# Patient Record
Sex: MALE | Race: WHITE | NOT HISPANIC OR LATINO | Employment: FULL TIME | URBAN - METROPOLITAN AREA
[De-identification: names, ages, dates, MRNs, and addresses within clinical notes are randomized per-mention and may not be internally consistent; named-entity substitution may affect disease eponyms.]

---

## 2021-04-26 ENCOUNTER — HISTORICAL (OUTPATIENT)
Dept: ADMINISTRATIVE | Facility: HOSPITAL | Age: 46
End: 2021-04-26

## 2021-04-26 LAB
ABS NEUT (OLG): 3.99 X10(3)/MCL (ref 2.1–9.2)
ERYTHROCYTE [DISTWIDTH] IN BLOOD BY AUTOMATED COUNT: 13.2 % (ref 11.5–17)
HCT VFR BLD AUTO: 45.1 % (ref 42–52)
HGB BLD-MCNC: 15.2 GM/DL (ref 14–18)
MCH RBC QN AUTO: 28.7 PG (ref 27–31)
MCHC RBC AUTO-ENTMCNC: 33.7 GM/DL (ref 33–36)
MCV RBC AUTO: 85.1 FL (ref 80–94)
NRBC BLD AUTO-RTO: 0 % (ref 0–0.2)
PLATELET # BLD AUTO: 249 X10(3)/MCL (ref 130–400)
PMV BLD AUTO: 10.7 FL (ref 7.4–10.4)
RBC # BLD AUTO: 5.3 X10(6)/MCL (ref 4.7–6.1)
WBC # SPEC AUTO: 6.5 X10(3)/MCL (ref 4.5–11.5)

## 2021-05-24 LAB — SARS-COV-2 AG RESP QL IA.RAPID: NEGATIVE

## 2021-05-25 ENCOUNTER — HISTORICAL (OUTPATIENT)
Dept: SURGERY | Facility: HOSPITAL | Age: 46
End: 2021-05-25

## 2021-08-04 ENCOUNTER — HISTORICAL (OUTPATIENT)
Dept: ADMINISTRATIVE | Facility: HOSPITAL | Age: 46
End: 2021-08-04

## 2022-04-11 ENCOUNTER — HISTORICAL (OUTPATIENT)
Dept: ADMINISTRATIVE | Facility: HOSPITAL | Age: 47
End: 2022-04-11

## 2022-05-03 NOTE — HISTORICAL [facility]
This is a historical note converted from Deejay. Formatting and pictures may have been removed.  Please reference Deejay for original formatting and attached multimedia. Chief Complaint  left knee pain no injury or surgery Referred by Maame..pain started in November..MRI [facility] 3-30-21  History of Present Illness  ?? He is a pleasant 45-year-old whose had left knee pain since?November 2020. ?The pains located on the medial side of the knee. ?He notes it worse with squatting and twisting movements. ?It somewhat better?at rest. ?He is tried anti-inflammatory medicines and aspiration and injection?with out relief.? He denies any numbness or tingling  Review of Systems  Comprehensive review of system?was performed with no exceptions other than noted in the history of present illness  Physical Exam  Vitals & Measurements  T:?97.4? ?F (Oral)? HR:?48(Apical)? BP:?147/90?  HT:?180.00?cm? WT:?127.000?kg? BMI:?39.2?  Gen: WN, WD, NAD  Card/Res: NL breathing, +distal pulses  Abdomen: ND  Standing exam  stance:?Correctable varus alignment, no significant leg-length discrepancy  gait:?Antalgic limp  ?   Knee examination  - General comments: unremarkable appearance  ?   - Tenderness: No joint line  ?   Knee??????????RIGHT??????????LEFT  Skin: ??????????Intact ??????????Intact  ROM:??????????0-130??????????0-130  Effusion:????? Neg ???????????? +  MJL TTP:????? Neg ???????????? +  LJL TTP: ?????? Neg ???????????? Neg  Jose:? ?Neg ???????????? +  Pat crep:?????? Neg ???????????????Neg  Patella TTPs: Neg ???????????????Neg  Patella grind: Neg??????????? ?Neg  Lachman: ?????Neg ????????????????????Neg  Pivot shift: ?????Neg ???????????? Neg  Valgus stress: Neg ???????????????Neg  Varus stress: Neg ???????????????Neg  Posterior drawer: Neg ??????????Neg  ?   N-V ????????????????????intact??????????intact  Hip:?????????????????????????nml?????????? nml  ?   Lower extremity edema:Negative  ?  Assessment/Plan  1.?Tear of medial  meniscus of left knee?S83.242A  ?I recommend surgical intervention: Left knee arthroscopic medial meniscus root repair.? We discussed the details of the procedure and the expected postoperative course.? We discussed the benefits of surgery which would be to decrease his pain and increase his function.? We discussed the risk of surgery?which is small but could be significant if he has continued pain, retear, stiffness, or infection.? After discussion he like to proceed.? He will call us with a surgical day  Ordered:  Office/Outpatient Visit [id number], Tear of medial meniscus of left knee, Orthopaed[facility] Clinic, 04/26/21 9:36:00 CDT  ?  Orders:  XR Knee Left 4 or More Views, Routine, 04/26/21 9:04:00 CDT, None, Ambulatory, Rad Type, Left knee pain, Not Scheduled, 04/26/21 9:04:00 CDT   Problem List/Past Medical History  Ongoing  Obesity  Tear of medial meniscus of left knee  Historical  No qualifying data  Procedure/Surgical History  Tonsillectomy (1993)   Medications  losartan 100 mg oral tablet, 100 mg= 1 tab(s), Oral, Daily  montelukast 10 mg oral TABLET, 10 mg= 1 tab(s), Oral, Daily  Allergies  No Known Medication Allergies  Social History  Abuse/Neglect  No, 04/26/2021  Tobacco  Never (less than 100 in lifetime), N/A, 04/26/2021  Health Maintenance  Health Maintenance  ???Pending?(in the next year)  ??? ??Due?  ??? ? ? ?ADL Screening due??04/26/21??and every 1??year(s)  ??? ? ? ?Aspirin Therapy for CVD Prevention due??04/26/21??and every 1??year(s)  ??? ? ? ?Tetanus Vaccine due??04/26/21??and every 10??year(s)  ??? ??Due In Future?  ??? ? ? ?Obesity Screening not due until??01/01/22??and every 1??year(s)  ??? ? ? ?Alcohol Misuse Screening not due until??01/02/22??and every 1??year(s)  ???Satisfied?(in the past 1 year)  ??? ??Satisfied?  ??? ? ? ?Alcohol Misuse Screening on??04/26/21.??Satisfied by Corrie Flynn LPN  ??? ? ? ?Blood Pressure Screening on??04/26/21.??Satisfied by Corrie Flynn LPN  ???  ? ? ?Body Mass Index Check on??04/26/21.??Satisfied by Corrie Flynn LPN  ??? ? ? ?Obesity Screening on??04/26/21.??Satisfied by Corrie Flynn LPN  ?  Diagnostic Results  Knee radiographs show no arthritis

## 2022-05-09 ENCOUNTER — OFFICE VISIT (OUTPATIENT)
Dept: ORTHOPEDICS | Facility: CLINIC | Age: 47
End: 2022-05-09
Payer: COMMERCIAL

## 2022-05-09 VITALS — HEIGHT: 71 IN | WEIGHT: 290 LBS | BODY MASS INDEX: 40.6 KG/M2

## 2022-05-09 DIAGNOSIS — M17.12 PRIMARY OSTEOARTHRITIS OF LEFT KNEE: Primary | ICD-10-CM

## 2022-05-09 PROCEDURE — 99213 OFFICE O/P EST LOW 20 MIN: CPT | Mod: 25,,, | Performed by: ORTHOPAEDIC SURGERY

## 2022-05-09 PROCEDURE — 3008F PR BODY MASS INDEX (BMI) DOCUMENTED: ICD-10-PCS | Mod: CPTII,,, | Performed by: ORTHOPAEDIC SURGERY

## 2022-05-09 PROCEDURE — 20610 LARGE JOINT ASPIRATION/INJECTION: L KNEE: ICD-10-PCS | Mod: LT,,, | Performed by: ORTHOPAEDIC SURGERY

## 2022-05-09 PROCEDURE — 1159F MED LIST DOCD IN RCRD: CPT | Mod: CPTII,,, | Performed by: ORTHOPAEDIC SURGERY

## 2022-05-09 PROCEDURE — 20610 DRAIN/INJ JOINT/BURSA W/O US: CPT | Mod: LT,,, | Performed by: ORTHOPAEDIC SURGERY

## 2022-05-09 PROCEDURE — 1159F PR MEDICATION LIST DOCUMENTED IN MEDICAL RECORD: ICD-10-PCS | Mod: CPTII,,, | Performed by: ORTHOPAEDIC SURGERY

## 2022-05-09 PROCEDURE — 3008F BODY MASS INDEX DOCD: CPT | Mod: CPTII,,, | Performed by: ORTHOPAEDIC SURGERY

## 2022-05-09 PROCEDURE — 99213 PR OFFICE/OUTPT VISIT, EST, LEVL III, 20-29 MIN: ICD-10-PCS | Mod: 25,,, | Performed by: ORTHOPAEDIC SURGERY

## 2022-05-09 RX ORDER — MONTELUKAST SODIUM 10 MG/1
10 TABLET ORAL DAILY
COMMUNITY
Start: 2021-12-11

## 2022-05-09 RX ORDER — CARVEDILOL PHOSPHATE 20 MG/1
20 CAPSULE, EXTENDED RELEASE ORAL
COMMUNITY
Start: 2021-11-15

## 2022-05-09 RX ORDER — LOSARTAN POTASSIUM AND HYDROCHLOROTHIAZIDE 25; 100 MG/1; MG/1
1 TABLET ORAL DAILY
COMMUNITY
Start: 2022-03-08

## 2022-05-09 RX ORDER — ROSUVASTATIN CALCIUM 10 MG/1
TABLET, COATED ORAL
COMMUNITY
Start: 2022-05-07

## 2022-05-09 RX ORDER — MELOXICAM 15 MG/1
15 TABLET ORAL DAILY
COMMUNITY
Start: 2022-04-18 | End: 2022-07-16

## 2022-05-09 RX ORDER — DEXAMETHASONE 6 MG/1
6 TABLET ORAL
COMMUNITY
Start: 2021-08-03

## 2022-05-09 RX ORDER — ALBUTEROL SULFATE 90 UG/1
AEROSOL, METERED RESPIRATORY (INHALATION)
COMMUNITY
Start: 2021-08-03

## 2022-05-09 NOTE — PROCEDURES
Large Joint Aspiration/Injection: L knee    Date/Time: 5/9/2022 8:15 AM  Performed by: Antoine Scott Jr., MD  Authorized by: Antoine Scott Jr., MD     Consent Done?:  Yes (Verbal)  Indications:  Pain and arthritis  Site marked: the procedure site was marked    Timeout: prior to procedure the correct patient, procedure, and site was verified    Prep: patient was prepped and draped in usual sterile fashion      Local anesthesia used?: Yes    Local anesthetic:  Topical anesthetic    Details:  Needle Size:  22 G  Ultrasonic Guidance for needle placement?: No    Approach:  Superior  Location:  Knee  Site:  L knee  Medications:  48 mg hylan g-f 20 48 mg/6 mL     Left SynviscONE

## 2022-05-09 NOTE — PROGRESS NOTES
Chief Complaint:   Chief Complaint   Patient presents with    Left Knee - Follow-up    Pain     Left knee synvisc injection       Consulting Physician: No ref. provider found    History of present illness:    5/25/2021: Left knee medial meniscus root repair    He returns today. Injection on 1/31/22 helped for about 6 weeks. 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 Denies effusion. We started the Mobic which is made a big difference. He status post MRI.      History reviewed. No pertinent past medical history.    Past Surgical History:   Procedure Laterality Date    KNEE SURGERY      TONSILLECTOMY         Current Outpatient Medications   Medication Sig    albuterol (PROAIR HFA) 90 mcg/actuation inhaler Inhale into the lungs.    CARVEDILOL PHOSPHATE 20 MG ORAL CM24 (COREG CR) 20 mg 24 hr capsule Take 20 mg by mouth.    dexAMETHasone (DECADRON) 6 MG tablet Take 6 mg by mouth.    losartan-hydrochlorothiazide 100-25 mg (HYZAAR) 100-25 mg per tablet Take 1 tablet by mouth once daily.    meloxicam (MOBIC) 15 MG tablet Take 15 mg by mouth once daily.    montelukast (SINGULAIR) 10 mg tablet Take 10 mg by mouth once daily.    rosuvastatin (CRESTOR) 10 MG tablet      No current facility-administered medications for this visit.       Review of patient's allergies indicates:  No Known Allergies    History reviewed. No pertinent family history.    Social History     Socioeconomic History    Marital status:    Tobacco Use    Smoking status: Never Smoker    Smokeless tobacco: Never Used   Substance and Sexual Activity    Alcohol use: Yes       Review of Systems:    Constitution:   Denies chills, fever, and sweats.  HENT:   Denies headaches or blurry vision.  Cardiovascular:  Denies chest pain or irregular heart beat.  Respiratory:   Denies cough or shortness of breath.  Gastrointestinal:  Denies abdominal pain, nausea, or vomiting.  Musculoskeletal:   Denies muscle  "cramps.  Neurological:   Denies dizziness or focal weakness.  Psychiatric/Behavior: Normal mental status.  Hematology/Lymph:  Denies bleeding problem or easy bruising/bleeding.  Skin:    Denies rash or suspicious lesions.    Examination:    Vital Signs:    Vitals:    05/09/22 0813   Weight: 131.5 kg (290 lb)   Height: 5' 11" (1.803 m)       Body mass index is 40.45 kg/m².    Constitution:   Well-developed, well nourished patient in no acute distress.  Neurological:   Alert and oriented x 3 and cooperative to examination.     Psychiatric/Behavior: Normal mental status.  Respiratory:   No shortness of breath.  Eyes:    Extraoccular muscles intact  Skin:    No scars, rash or suspicious lesions.    MSK:   .Standing exam  stance: normal alignment, no significant leg-length discrepancy  gait:     Knee examination  - General comments: unremarkable appearance    - Tenderness: medial joint line    Knee                  RIGHT    LEFT  Skin:                  Intact      Intact  ROM:                 0-130      0-130  Effusion:             Neg        +  MJL TTP:           Neg         +  LJL TTP:            Neg         Neg  Jose:         Neg          +  Pat crep:            Neg         Neg  Patella TTPs:     Neg         Neg  Patella grind:      Neg        Neg  Lachman:           Neg        Neg  Pivot shift:          Neg        Neg  Valgus stress:    Neg        Neg  Varus stress:      Neg        Neg  Posterior drawer: Neg       Neg    N-V intact intact  Hip: nml nml    Lower extremity edema:Negative         Assessment: Primary osteoarthritis of left knee  -     Large Joint Aspiration/Injection: L knee        Plan:  Synvisc injection today    "

## 2023-12-24 ENCOUNTER — OFFICE VISIT (OUTPATIENT)
Dept: URGENT CARE | Facility: CLINIC | Age: 48
End: 2023-12-24
Payer: COMMERCIAL

## 2023-12-24 VITALS
RESPIRATION RATE: 18 BRPM | SYSTOLIC BLOOD PRESSURE: 107 MMHG | TEMPERATURE: 98 F | HEIGHT: 71 IN | HEART RATE: 75 BPM | OXYGEN SATURATION: 96 % | DIASTOLIC BLOOD PRESSURE: 70 MMHG | WEIGHT: 290 LBS | BODY MASS INDEX: 40.6 KG/M2

## 2023-12-24 DIAGNOSIS — J10.1 INFLUENZA A: Primary | ICD-10-CM

## 2023-12-24 LAB
CTP QC/QA: YES
MOLECULAR STREP A: NEGATIVE
POC MOLECULAR INFLUENZA A AGN: POSITIVE
POC MOLECULAR INFLUENZA B AGN: NEGATIVE
SARS-COV-2 RDRP RESP QL NAA+PROBE: NEGATIVE

## 2023-12-24 PROCEDURE — 87635: ICD-10-PCS | Mod: QW,,,

## 2023-12-24 PROCEDURE — 99204 OFFICE O/P NEW MOD 45 MIN: CPT | Mod: ,,,

## 2023-12-24 PROCEDURE — 87502 INFLUENZA DNA AMP PROBE: CPT | Mod: QW,,,

## 2023-12-24 PROCEDURE — 99204 PR OFFICE/OUTPT VISIT, NEW, LEVL IV, 45-59 MIN: ICD-10-PCS | Mod: ,,,

## 2023-12-24 PROCEDURE — 87651 POCT STREP A MOLECULAR: ICD-10-PCS | Mod: QW,,,

## 2023-12-24 PROCEDURE — 87502 POCT INFLUENZA A/B MOLECULAR: ICD-10-PCS | Mod: QW,,,

## 2023-12-24 PROCEDURE — 87651 STREP A DNA AMP PROBE: CPT | Mod: QW,,,

## 2023-12-24 PROCEDURE — 87635 SARS-COV-2 COVID-19 AMP PRB: CPT | Mod: QW,,,

## 2023-12-24 RX ORDER — PROMETHAZINE HYDROCHLORIDE AND DEXTROMETHORPHAN HYDROBROMIDE 6.25; 15 MG/5ML; MG/5ML
5 SYRUP ORAL EVERY 6 HOURS PRN
Qty: 118 ML | Refills: 0 | Status: SHIPPED | OUTPATIENT
Start: 2023-12-24 | End: 2024-01-03

## 2023-12-24 RX ORDER — OSELTAMIVIR PHOSPHATE 75 MG/1
75 CAPSULE ORAL 2 TIMES DAILY
Qty: 10 CAPSULE | Refills: 0 | Status: SHIPPED | OUTPATIENT
Start: 2023-12-24 | End: 2023-12-29

## 2023-12-24 RX ORDER — TIRZEPATIDE 7.5 MG/.5ML
7.5 INJECTION, SOLUTION SUBCUTANEOUS WEEKLY
COMMUNITY
Start: 2023-11-28

## 2023-12-24 RX ORDER — PROMETHAZINE HYDROCHLORIDE AND DEXTROMETHORPHAN HYDROBROMIDE 6.25; 15 MG/5ML; MG/5ML
5 SYRUP ORAL EVERY 6 HOURS PRN
Qty: 118 ML | Refills: 0 | Status: SHIPPED | OUTPATIENT
Start: 2023-12-24 | End: 2023-12-24

## 2023-12-24 RX ORDER — OSELTAMIVIR PHOSPHATE 75 MG/1
75 CAPSULE ORAL 2 TIMES DAILY
Qty: 10 CAPSULE | Refills: 0 | Status: SHIPPED | OUTPATIENT
Start: 2023-12-24 | End: 2023-12-24

## 2023-12-24 NOTE — PATIENT INSTRUCTIONS
Flu A+      Excuse through Thursday.     Drink plenty of fluids. Get plenty of rest.     Prescription cough syrup may make you drowsy, do not drive while taking or take nightly.   Nasal spray such as Nasacort or Flonase for congestion.  Warm saltwater gargles for sore throat.   Tylenol or ibuprofen as needed for sore throat and fever. May alternate every 3 hours.  Chloraseptic Spray for worsening sore throat  Call or return to clinic as needed   Go to the ER with any significant change or worsening of symptoms.   Follow up with your primary care doctor.

## 2023-12-24 NOTE — PROGRESS NOTES
"Subjective:      Patient ID: Estiven Esqueda is a 48 y.o. male.    Vitals:  height is 5' 11" (1.803 m) and weight is 131.5 kg (290 lb). His temperature is 98.4 °F (36.9 °C). His blood pressure is 107/70 and his pulse is 75. His respiration is 18 and oxygen saturation is 96%.     Chief Complaint: Cough (Pt presents to clinic with cough, fever , body aches , chills and sore throat. Symptomatic x this morning.)    Patient is a 48-year-old male who presents to urgent care clinic with complaints of cough, fever, T-max 100.2° at home with body aches and chills this morning.  Patient does report mild sore throat.  Denies neck stiffness, rash, GI symptoms, shortness of breath.  Patient's spouse is here with similar symptoms.    Cough        Respiratory:  Positive for cough.       Objective:     Physical Exam   Constitutional: He is oriented to person, place, and time. He appears well-developed. He is cooperative.  Non-toxic appearance. He does not appear ill. No distress.   HENT:   Head: Normocephalic and atraumatic.   Ears:   Right Ear: Hearing, tympanic membrane, external ear and ear canal normal.   Left Ear: Hearing, tympanic membrane, external ear and ear canal normal.   Nose: Nose normal. No mucosal edema, rhinorrhea or nasal deformity. No epistaxis. Right sinus exhibits no maxillary sinus tenderness and no frontal sinus tenderness. Left sinus exhibits no maxillary sinus tenderness and no frontal sinus tenderness.   Mouth/Throat: Uvula is midline and mucous membranes are normal. Mucous membranes are moist. No trismus in the jaw. Normal dentition. No uvula swelling. Posterior oropharyngeal erythema present. No oropharyngeal exudate or posterior oropharyngeal edema.      Comments: Postnasal drip  Eyes: Conjunctivae and lids are normal. No scleral icterus.   Neck: Trachea normal and phonation normal. Neck supple. No edema present. No erythema present. No neck rigidity present.   Cardiovascular: Normal rate, regular rhythm, " normal heart sounds and normal pulses.   Pulmonary/Chest: Effort normal. No respiratory distress. He has no decreased breath sounds. He has no wheezes. He has no rhonchi. He has no rales.   Abdominal: Normal appearance.   Musculoskeletal: Normal range of motion.         General: No deformity. Normal range of motion.   Lymphadenopathy:     He has no cervical adenopathy.   Neurological: He is alert and oriented to person, place, and time. He exhibits normal muscle tone. Coordination normal.   Skin: Skin is warm, dry, intact, not diaphoretic and not pale.   Psychiatric: His speech is normal and behavior is normal. Judgment and thought content normal.   Nursing note and vitals reviewed.      Assessment:     1. Influenza A        Plan:       Influenza A  -     POCT COVID-19 Rapid Screening  -     POCT Strep A, Molecular  -     POCT Influenza A/B Molecular  -     oseltamivir (TAMIFLU) 75 MG capsule; Take 1 capsule (75 mg total) by mouth 2 (two) times daily. for 5 days  Dispense: 10 capsule; Refill: 0  -     promethazine-dextromethorphan (PROMETHAZINE-DM) 6.25-15 mg/5 mL Syrp; Take 5 mLs by mouth every 6 (six) hours as needed.  Dispense: 118 mL; Refill: 0              Flu A+      Excuse through Thursday.     Drink plenty of fluids. Get plenty of rest.     Prescription cough syrup may make you drowsy, do not drive while taking or take nightly.   Nasal spray such as Nasacort or Flonase for congestion.  Warm saltwater gargles for sore throat.   Tylenol or ibuprofen as needed for sore throat and fever. May alternate every 3 hours.  Chloraseptic Spray for worsening sore throat  Call or return to clinic as needed   Go to the ER with any significant change or worsening of symptoms.   Follow up with your primary care doctor.